# Patient Record
Sex: MALE | Race: WHITE | Employment: FULL TIME | URBAN - METROPOLITAN AREA
[De-identification: names, ages, dates, MRNs, and addresses within clinical notes are randomized per-mention and may not be internally consistent; named-entity substitution may affect disease eponyms.]

---

## 2023-03-13 RX ORDER — LATANOPROST 50 UG/ML
1 SOLUTION/ DROPS OPHTHALMIC
COMMUNITY

## 2023-03-13 RX ORDER — METOPROLOL SUCCINATE 50 MG/1
TABLET, EXTENDED RELEASE ORAL DAILY
COMMUNITY

## 2023-03-13 RX ORDER — LEVOTHYROXINE SODIUM 125 UG/1
TABLET ORAL
COMMUNITY

## 2023-03-13 RX ORDER — ALLOPURINOL 300 MG/1
TABLET ORAL DAILY
COMMUNITY

## 2023-03-13 RX ORDER — LISINOPRIL AND HYDROCHLOROTHIAZIDE 10; 12.5 MG/1; MG/1
TABLET ORAL DAILY
COMMUNITY

## 2023-03-13 RX ORDER — SIMVASTATIN 40 MG/1
TABLET, FILM COATED ORAL
COMMUNITY

## 2023-03-14 ENCOUNTER — APPOINTMENT (OUTPATIENT)
Dept: GENERAL RADIOLOGY | Age: 78
End: 2023-03-14
Attending: INTERNAL MEDICINE
Payer: MEDICARE

## 2023-03-14 ENCOUNTER — HOSPITAL ENCOUNTER (OUTPATIENT)
Age: 78
Discharge: HOME | End: 2023-03-15
Attending: INTERNAL MEDICINE | Admitting: INTERNAL MEDICINE
Payer: MEDICARE

## 2023-03-14 DIAGNOSIS — I20.0 UNSTABLE ANGINA (HCC): ICD-10-CM

## 2023-03-14 PROBLEM — R07.9 CHEST PAIN: Status: ACTIVE | Noted: 2023-03-14

## 2023-03-14 LAB
ACT BLD: 191 SEC (ref 74–125)
ALBUMIN SERPL-MCNC: 3.8 G/DL (ref 3.5–5)
ALBUMIN/GLOB SERPL: 1.3 (ref 1.1–2.2)
ALP SERPL-CCNC: 98 U/L (ref 45–117)
ALT SERPL-CCNC: 31 U/L (ref 12–78)
ANION GAP SERPL CALC-SCNC: 2 MMOL/L (ref 5–15)
AST SERPL W P-5'-P-CCNC: 19 U/L (ref 15–37)
ATRIAL RATE: 58 BPM
BILIRUB SERPL-MCNC: 0.5 MG/DL (ref 0.2–1)
BUN SERPL-MCNC: 23 MG/DL (ref 6–20)
BUN/CREAT SERPL: 20 (ref 12–20)
CA-I BLD-MCNC: 9.4 MG/DL (ref 8.5–10.1)
CALCULATED P AXIS, ECG09: 21 DEGREES
CALCULATED R AXIS, ECG10: 24 DEGREES
CALCULATED T AXIS, ECG11: 23 DEGREES
CHLORIDE SERPL-SCNC: 109 MMOL/L (ref 97–108)
CO2 SERPL-SCNC: 29 MMOL/L (ref 21–32)
CREAT SERPL-MCNC: 1.14 MG/DL (ref 0.7–1.3)
DIAGNOSIS, 93000: NORMAL
ERYTHROCYTE [DISTWIDTH] IN BLOOD BY AUTOMATED COUNT: 13 % (ref 11.5–14.5)
GLOBULIN SER CALC-MCNC: 3 G/DL (ref 2–4)
GLUCOSE SERPL-MCNC: 93 MG/DL (ref 65–100)
HCT VFR BLD AUTO: 44.9 % (ref 36.6–50.3)
HGB BLD-MCNC: 15.1 G/DL (ref 12.1–17)
MCH RBC QN AUTO: 31.8 PG (ref 26–34)
MCHC RBC AUTO-ENTMCNC: 33.6 G/DL (ref 30–36.5)
MCV RBC AUTO: 94.5 FL (ref 80–99)
NRBC # BLD: 0 K/UL (ref 0–0.01)
NRBC BLD-RTO: 0 PER 100 WBC
P-R INTERVAL, ECG05: 224 MS
PERFORMED BY, TECHID: ABNORMAL
PLATELET # BLD AUTO: 159 K/UL (ref 150–400)
PMV BLD AUTO: 10.5 FL (ref 8.9–12.9)
POTASSIUM SERPL-SCNC: 4.4 MMOL/L (ref 3.5–5.1)
PROT SERPL-MCNC: 6.8 G/DL (ref 6.4–8.2)
Q-T INTERVAL, ECG07: 408 MS
QRS DURATION, ECG06: 86 MS
QTC CALCULATION (BEZET), ECG08: 400 MS
RBC # BLD AUTO: 4.75 M/UL (ref 4.1–5.7)
SODIUM SERPL-SCNC: 140 MMOL/L (ref 136–145)
VENTRICULAR RATE, ECG03: 58 BPM
WBC # BLD AUTO: 5.8 K/UL (ref 4.1–11.1)

## 2023-03-14 PROCEDURE — 2709999900 HC NON-CHARGEABLE SUPPLY: Performed by: INTERNAL MEDICINE

## 2023-03-14 PROCEDURE — 99153 MOD SED SAME PHYS/QHP EA: CPT | Performed by: INTERNAL MEDICINE

## 2023-03-14 PROCEDURE — C1894 INTRO/SHEATH, NON-LASER: HCPCS | Performed by: INTERNAL MEDICINE

## 2023-03-14 PROCEDURE — 77030019698 HC SYR ANGI MDLON MRTM -A: Performed by: INTERNAL MEDICINE

## 2023-03-14 PROCEDURE — 80061 LIPID PANEL: CPT

## 2023-03-14 PROCEDURE — 99152 MOD SED SAME PHYS/QHP 5/>YRS: CPT | Performed by: INTERNAL MEDICINE

## 2023-03-14 PROCEDURE — 74011000250 HC RX REV CODE- 250: Performed by: INTERNAL MEDICINE

## 2023-03-14 PROCEDURE — 85027 COMPLETE CBC AUTOMATED: CPT

## 2023-03-14 PROCEDURE — 77030013516 HC DEV INFL ANGI MRTM -B: Performed by: INTERNAL MEDICINE

## 2023-03-14 PROCEDURE — C1769 GUIDE WIRE: HCPCS | Performed by: INTERNAL MEDICINE

## 2023-03-14 PROCEDURE — 85347 COAGULATION TIME ACTIVATED: CPT

## 2023-03-14 PROCEDURE — 93454 CORONARY ARTERY ANGIO S&I: CPT | Performed by: INTERNAL MEDICINE

## 2023-03-14 PROCEDURE — 80053 COMPREHEN METABOLIC PANEL: CPT

## 2023-03-14 PROCEDURE — 74011000636 HC RX REV CODE- 636: Performed by: INTERNAL MEDICINE

## 2023-03-14 PROCEDURE — C1876 STENT, NON-COA/NON-COV W/DEL: HCPCS | Performed by: INTERNAL MEDICINE

## 2023-03-14 PROCEDURE — 77030041064 HC CATH DX ANGI DXTRTY MEDT -B: Performed by: INTERNAL MEDICINE

## 2023-03-14 PROCEDURE — 77030008814 HC VLV ACC PLUS MRTM -B: Performed by: INTERNAL MEDICINE

## 2023-03-14 PROCEDURE — 74011250637 HC RX REV CODE- 250/637: Performed by: INTERNAL MEDICINE

## 2023-03-14 PROCEDURE — 93005 ELECTROCARDIOGRAM TRACING: CPT

## 2023-03-14 PROCEDURE — 77030008542 HC TBNG MON PRSS EDWD -A: Performed by: INTERNAL MEDICINE

## 2023-03-14 PROCEDURE — 36415 COLL VENOUS BLD VENIPUNCTURE: CPT

## 2023-03-14 PROCEDURE — 77030025703 HC SYR ANGI VACLOK MRTM -A: Performed by: INTERNAL MEDICINE

## 2023-03-14 PROCEDURE — 76210000017 HC OR PH I REC 1.5 TO 2 HR: Performed by: INTERNAL MEDICINE

## 2023-03-14 PROCEDURE — 71045 X-RAY EXAM CHEST 1 VIEW: CPT

## 2023-03-14 PROCEDURE — 77030029997 HC DEV COM RDL R BND TELE -B: Performed by: INTERNAL MEDICINE

## 2023-03-14 PROCEDURE — C1725 CATH, TRANSLUMIN NON-LASER: HCPCS | Performed by: INTERNAL MEDICINE

## 2023-03-14 PROCEDURE — C1887 CATHETER, GUIDING: HCPCS | Performed by: INTERNAL MEDICINE

## 2023-03-14 PROCEDURE — 74011250636 HC RX REV CODE- 250/636: Performed by: INTERNAL MEDICINE

## 2023-03-14 PROCEDURE — 92928 PRQ TCAT PLMT NTRAC ST 1 LES: CPT | Performed by: INTERNAL MEDICINE

## 2023-03-14 DEVICE — IMPLANTABLE DEVICE: Type: IMPLANTABLE DEVICE | Status: FUNCTIONAL

## 2023-03-14 RX ORDER — SODIUM CHLORIDE 0.9 % (FLUSH) 0.9 %
5-40 SYRINGE (ML) INJECTION EVERY 8 HOURS
Status: DISCONTINUED | OUTPATIENT
Start: 2023-03-14 | End: 2023-03-15 | Stop reason: HOSPADM

## 2023-03-14 RX ORDER — SODIUM CHLORIDE 0.9 % (FLUSH) 0.9 %
5-40 SYRINGE (ML) INJECTION AS NEEDED
Status: DISCONTINUED | OUTPATIENT
Start: 2023-03-14 | End: 2023-03-15 | Stop reason: HOSPADM

## 2023-03-14 RX ORDER — ALLOPURINOL 300 MG/1
300 TABLET ORAL DAILY
Status: DISCONTINUED | OUTPATIENT
Start: 2023-03-15 | End: 2023-03-15 | Stop reason: HOSPADM

## 2023-03-14 RX ORDER — GUAIFENESIN 100 MG/5ML
LIQUID (ML) ORAL AS NEEDED
Status: DISCONTINUED | OUTPATIENT
Start: 2023-03-14 | End: 2023-03-14 | Stop reason: HOSPADM

## 2023-03-14 RX ORDER — ATORVASTATIN CALCIUM 20 MG/1
20 TABLET, FILM COATED ORAL
Status: DISCONTINUED | OUTPATIENT
Start: 2023-03-14 | End: 2023-03-15 | Stop reason: HOSPADM

## 2023-03-14 RX ORDER — LATANOPROST 50 UG/ML
1 SOLUTION/ DROPS OPHTHALMIC
Status: DISCONTINUED | OUTPATIENT
Start: 2023-03-14 | End: 2023-03-14

## 2023-03-14 RX ORDER — OXYCODONE AND ACETAMINOPHEN 5; 325 MG/1; MG/1
1 TABLET ORAL
Status: DISCONTINUED | OUTPATIENT
Start: 2023-03-14 | End: 2023-03-15 | Stop reason: HOSPADM

## 2023-03-14 RX ORDER — ACETAMINOPHEN 325 MG/1
650 TABLET ORAL
Status: DISCONTINUED | OUTPATIENT
Start: 2023-03-14 | End: 2023-03-15 | Stop reason: HOSPADM

## 2023-03-14 RX ORDER — NALOXONE HYDROCHLORIDE 0.4 MG/ML
0.4 INJECTION, SOLUTION INTRAMUSCULAR; INTRAVENOUS; SUBCUTANEOUS AS NEEDED
Status: DISCONTINUED | OUTPATIENT
Start: 2023-03-14 | End: 2023-03-15 | Stop reason: HOSPADM

## 2023-03-14 RX ORDER — SIMVASTATIN 40 MG/1
40 TABLET, FILM COATED ORAL
Status: DISCONTINUED | OUTPATIENT
Start: 2023-03-14 | End: 2023-03-14

## 2023-03-14 RX ORDER — LATANOPROST 50 UG/ML
1 SOLUTION/ DROPS OPHTHALMIC EVERY EVENING
Status: DISCONTINUED | OUTPATIENT
Start: 2023-03-14 | End: 2023-03-14

## 2023-03-14 RX ORDER — SODIUM CHLORIDE 9 MG/ML
100 INJECTION, SOLUTION INTRAVENOUS CONTINUOUS
Status: DISPENSED | OUTPATIENT
Start: 2023-03-14 | End: 2023-03-14

## 2023-03-14 RX ORDER — LISINOPRIL 5 MG/1
10 TABLET ORAL DAILY
Status: DISCONTINUED | OUTPATIENT
Start: 2023-03-15 | End: 2023-03-15 | Stop reason: HOSPADM

## 2023-03-14 RX ORDER — SODIUM CHLORIDE 9 MG/ML
60 INJECTION, SOLUTION INTRAVENOUS CONTINUOUS
Status: DISCONTINUED | OUTPATIENT
Start: 2023-03-14 | End: 2023-03-15 | Stop reason: HOSPADM

## 2023-03-14 RX ORDER — HEPARIN SODIUM 1000 [USP'U]/ML
INJECTION, SOLUTION INTRAVENOUS; SUBCUTANEOUS AS NEEDED
Status: DISCONTINUED | OUTPATIENT
Start: 2023-03-14 | End: 2023-03-14 | Stop reason: HOSPADM

## 2023-03-14 RX ORDER — METOPROLOL SUCCINATE 50 MG/1
50 TABLET, EXTENDED RELEASE ORAL DAILY
Status: DISCONTINUED | OUTPATIENT
Start: 2023-03-15 | End: 2023-03-15 | Stop reason: HOSPADM

## 2023-03-14 RX ORDER — LATANOPROST 50 UG/ML
1 SOLUTION/ DROPS OPHTHALMIC
Status: DISCONTINUED | OUTPATIENT
Start: 2023-03-14 | End: 2023-03-15 | Stop reason: HOSPADM

## 2023-03-14 RX ORDER — FENTANYL CITRATE 50 UG/ML
INJECTION, SOLUTION INTRAMUSCULAR; INTRAVENOUS AS NEEDED
Status: DISCONTINUED | OUTPATIENT
Start: 2023-03-14 | End: 2023-03-14 | Stop reason: HOSPADM

## 2023-03-14 RX ORDER — MIDAZOLAM HYDROCHLORIDE 1 MG/ML
INJECTION INTRAMUSCULAR; INTRAVENOUS AS NEEDED
Status: DISCONTINUED | OUTPATIENT
Start: 2023-03-14 | End: 2023-03-14 | Stop reason: HOSPADM

## 2023-03-14 RX ORDER — GUAIFENESIN 100 MG/5ML
81 LIQUID (ML) ORAL DAILY
Status: DISCONTINUED | OUTPATIENT
Start: 2023-03-15 | End: 2023-03-15 | Stop reason: HOSPADM

## 2023-03-14 RX ORDER — LISINOPRIL AND HYDROCHLOROTHIAZIDE 10; 12.5 MG/1; MG/1
1 TABLET ORAL DAILY
Status: DISCONTINUED | OUTPATIENT
Start: 2023-03-15 | End: 2023-03-14

## 2023-03-14 RX ORDER — LIDOCAINE HYDROCHLORIDE 10 MG/ML
INJECTION INFILTRATION; PERINEURAL AS NEEDED
Status: DISCONTINUED | OUTPATIENT
Start: 2023-03-14 | End: 2023-03-14 | Stop reason: HOSPADM

## 2023-03-14 RX ORDER — HEPARIN SODIUM 200 [USP'U]/100ML
INJECTION, SOLUTION INTRAVENOUS
Status: COMPLETED | OUTPATIENT
Start: 2023-03-14 | End: 2023-03-14

## 2023-03-14 RX ORDER — CLOPIDOGREL 300 MG/1
TABLET, FILM COATED ORAL AS NEEDED
Status: DISCONTINUED | OUTPATIENT
Start: 2023-03-14 | End: 2023-03-14 | Stop reason: HOSPADM

## 2023-03-14 RX ORDER — HYDROCHLOROTHIAZIDE 25 MG/1
12.5 TABLET ORAL DAILY
Status: DISCONTINUED | OUTPATIENT
Start: 2023-03-15 | End: 2023-03-15 | Stop reason: HOSPADM

## 2023-03-14 RX ORDER — VERAPAMIL HYDROCHLORIDE 2.5 MG/ML
INJECTION, SOLUTION INTRAVENOUS AS NEEDED
Status: DISCONTINUED | OUTPATIENT
Start: 2023-03-14 | End: 2023-03-14 | Stop reason: HOSPADM

## 2023-03-14 RX ORDER — CLOPIDOGREL BISULFATE 75 MG/1
75 TABLET ORAL DAILY
Status: DISCONTINUED | OUTPATIENT
Start: 2023-03-15 | End: 2023-03-15 | Stop reason: HOSPADM

## 2023-03-14 RX ORDER — NITROGLYCERIN 5 MG/ML
INJECTION, SOLUTION INTRAVENOUS AS NEEDED
Status: DISCONTINUED | OUTPATIENT
Start: 2023-03-14 | End: 2023-03-14 | Stop reason: HOSPADM

## 2023-03-14 RX ORDER — ONDANSETRON 2 MG/ML
4 INJECTION INTRAMUSCULAR; INTRAVENOUS
Status: DISCONTINUED | OUTPATIENT
Start: 2023-03-14 | End: 2023-03-15 | Stop reason: HOSPADM

## 2023-03-14 RX ADMIN — LATANOPROST 1 DROP: 50 SOLUTION OPHTHALMIC at 21:05

## 2023-03-14 RX ADMIN — ATORVASTATIN CALCIUM 20 MG: 20 TABLET, FILM COATED ORAL at 21:05

## 2023-03-14 RX ADMIN — SODIUM CHLORIDE 60 ML/HR: 9 INJECTION, SOLUTION INTRAVENOUS at 11:40

## 2023-03-14 RX ADMIN — SODIUM CHLORIDE, PRESERVATIVE FREE 10 ML: 5 INJECTION INTRAVENOUS at 21:05

## 2023-03-14 NOTE — PERIOP NOTES
TRANSFER - OUT REPORT:    Verbal report given to PATIENTS [facility], RN(name) on Chino Mendoza  being transferred to [facility](unit) for routine post - op       Report consisted of patients Situation, Background, Assessment and   Recommendations(SBAR). Information from the following report(s) SBAR, Procedure Summary, Intake/Output, and MAR was reviewed with the receiving nurse. Opportunity for questions and clarification was provided.       Patient transported with:   Monitor  Registered Nurse

## 2023-03-14 NOTE — PROGRESS NOTES
2 person skin assessment performed with Sami Carmichael RN. Patients skin is clean, dry, and intact.

## 2023-03-14 NOTE — Clinical Note
Contrast Dose Calculator:   Patient's age: 68.   Patient's sex: Male. Patient weight (kg) = 97.5. Creatinine level (mg/dL) = 1.14. Creatinine clearance (mL/min): 75.   Contrast concentration (mg/mL) = 370. MACD = 300 mL. Max Contrast dose per Creatinine Cl calculator = 168.75 mL.

## 2023-03-15 VITALS
SYSTOLIC BLOOD PRESSURE: 128 MMHG | DIASTOLIC BLOOD PRESSURE: 68 MMHG | RESPIRATION RATE: 18 BRPM | HEIGHT: 70 IN | BODY MASS INDEX: 30.78 KG/M2 | TEMPERATURE: 98.4 F | WEIGHT: 215 LBS | HEART RATE: 76 BPM | OXYGEN SATURATION: 98 %

## 2023-03-15 LAB
CHOLEST SERPL-MCNC: 126 MG/DL
HDLC SERPL-MCNC: 35 MG/DL
HDLC SERPL: 3.6 (ref 0–5)
LDLC SERPL CALC-MCNC: 67.4 MG/DL (ref 0–100)
LIPID PROFILE,FLP: NORMAL
TRIGL SERPL-MCNC: 118 MG/DL (ref ?–150)
VLDLC SERPL CALC-MCNC: 23.6 MG/DL

## 2023-03-15 PROCEDURE — 74011000250 HC RX REV CODE- 250: Performed by: INTERNAL MEDICINE

## 2023-03-15 PROCEDURE — 74011250637 HC RX REV CODE- 250/637: Performed by: INTERNAL MEDICINE

## 2023-03-15 RX ORDER — CLOPIDOGREL BISULFATE 75 MG/1
75 TABLET ORAL DAILY
Qty: 30 TABLET | Refills: 3 | Status: SHIPPED | OUTPATIENT
Start: 2023-03-16

## 2023-03-15 RX ORDER — ATORVASTATIN CALCIUM 20 MG/1
20 TABLET, FILM COATED ORAL DAILY
Qty: 30 TABLET | Refills: 1 | Status: SHIPPED | OUTPATIENT
Start: 2023-03-15

## 2023-03-15 RX ADMIN — HYDROCHLOROTHIAZIDE 12.5 MG: 25 TABLET ORAL at 08:38

## 2023-03-15 RX ADMIN — METOPROLOL SUCCINATE 50 MG: 50 TABLET, EXTENDED RELEASE ORAL at 08:37

## 2023-03-15 RX ADMIN — ALLOPURINOL 300 MG: 300 TABLET ORAL at 08:37

## 2023-03-15 RX ADMIN — LEVOTHYROXINE SODIUM 125 MCG: 0.03 TABLET ORAL at 08:37

## 2023-03-15 RX ADMIN — SODIUM CHLORIDE, PRESERVATIVE FREE 10 ML: 5 INJECTION INTRAVENOUS at 06:19

## 2023-03-15 RX ADMIN — CLOPIDOGREL BISULFATE 75 MG: 75 TABLET ORAL at 08:37

## 2023-03-15 RX ADMIN — ASPIRIN 81 MG 81 MG: 81 TABLET ORAL at 08:37

## 2023-03-15 RX ADMIN — LISINOPRIL 10 MG: 5 TABLET ORAL at 08:37

## 2023-03-15 NOTE — DISCHARGE INSTRUCTIONS
Patient has been referred for outpatient cardiac rehab  [facility]  [address], [facility] Samy Cha  [phone]  Appointment:  Wednesday, April 5 at 1:30

## 2023-03-15 NOTE — PROGRESS NOTES
[address] March 15, 2023       RE: Adriane Duncan      To Whom It May Concern,    This is to certify that Adriane Duncan may return to work on 3/20/2023. Please feel free to contact my office if you have any questions or concerns. Thank you for your assistance in this matter.       Sincerely,    MD Bette Havrey MD    [phone] (office)

## 2023-03-15 NOTE — PROGRESS NOTES
Problem: Falls - Risk of  Goal: *Absence of Falls  Description: Document Sundra Dublin Fall Risk and appropriate interventions in the flowsheet.   Outcome: Progressing Towards Goal  Note: Fall Risk Interventions:                                Problem: Patient Education: Go to Patient Education Activity  Goal: Patient/Family Education  Outcome: Progressing Towards Goal

## 2023-03-15 NOTE — PROGRESS NOTES
Patients IV and telemetry box removed. Discharged papers signed. Patient in room getting dressed. Notified Dr. Norma Cullen of the new prescriptions not appearing to be sent to pharmacy per Discharge paperwork, was reassured by Dr. Norma Cullen prescriptions were sent. Patient notified and provided the number to [facility] if he were to have any questions getting new prescriptions. Discharge plan of care/case management plan validated with provider discharge order.

## 2023-03-15 NOTE — PROGRESS NOTES
Problem: Falls - Risk of  Goal: *Absence of Falls  Description: Document Satish Marvin Fall Risk and appropriate interventions in the flowsheet.   Outcome: Progressing Towards Goal  Note: Fall Risk Interventions:                                Problem: Patient Education: Go to Patient Education Activity  Goal: Patient/Family Education  Outcome: Progressing Towards Goal

## 2023-03-15 NOTE — PROGRESS NOTES
Provided patient education about and explained the importance of medication compliance. Patient stated that he can afford the antiplatelet medication prescribed. Patient was advised that if the antiplatelet medication becomes unaffordable, he must notify the cardiologist immediately so that an alternate plan for antiplatelet therapy can be made. Patient stated that he will fill this prescription before or upon discharge so that it will be available for the next scheduled dose after discharge. Patient asked appropriate questions and verbalized understanding during this consultation and was given printed teaching materials and my contact information in case I can provide further assistance. We discussed the options to have prescriptions filled. Spoke with patient about phase 2 outpatient cardiac rehab. Patient was given a choice of facilities to be enrolled and chose [facility]. Patient was scheduled for and made aware of, verbally and in writing, an initial evaluation appointment to begin cardiac rehab on April 5 at 1:30. Patient was made aware of this verbally and in writing. Patient's information was forwarded to this facility. Patient verbalized understanding and was given printed teaching materials, my contact information in case he needs further assistance, and the address and phone number for the cardiac rehab facility to which he has been referred.

## 2023-03-15 NOTE — DISCHARGE SUMMARY
Cardiology Discharge Summary     Patient ID: Laura Motta  [id number]  87 y.o.  1945    Admit Date: 3/14/2023  Discharge Date: 3/15/2023   Admitting Physician: Jacquelyn Gaytan MD   Discharge Physician: Anca Calderón MD    Admission Diagnoses: Unstable angina [facility]) [I20.0]  Chest pain [R07.9]    Discharge Diagnoses: Active Problems:    Chest pain (3/14/2023)    Unstable angina  S/p Carotid endarterectomy/right  Peripheral arterial disease  Hypertension  For cholesterolemia  Both thyroidism    Cardiology Procedures this Admission:  Left heart catheterization with PCI    Hospital Course: Patient was admitted with unstable angina has a history of CAD status post PCI 2002. He had left heart cath 3/14/2023 with findings as below:      Indication unstable angina. Procedure selective coronary angiogram in the left and right system, angioplasty and stenting of the proximal to mid LAD, IV conscious sedation using Versed and fentanyl. Proximal to mid LAD is a type II 80% stenosis at the ostium of the first diagonal branch. Left main with irregularities. The circumflex is nondominant with irregularities. Dominant and patent right coronary artery including stented segment in its midportion. Status post angioplasty and stenting of the proximal to mid LAD using 3.0 x 12 drug-eluting Palmer stent to a size of 3.3 mm meter. Continue dual antiplatelet therapy, risk modification. Recent Labs     03/14/23  1130      K 4.4   BUN 23*   CREA 1.14   WBC 5.8   HGB 15.1   HCT 44.9        No results found for: CHOL, CHOLX, CHLST, CHOLV, HDL, HDLP, LDL, LDLC, DLDLP, TGLX, TRIGL, TRIGP  Recent Labs     03/14/23  1130   ALT 31   AP 98     No results for input(s): INR, PTP, APTT, INREXT in the last 72 hours. Patient was ambulating without symptoms prior to d/c.     Consults: None  Disposition: home  Discharge Condition: Good  Patient Instructions:   Current Discharge Medication List        START taking these medications    Details   clopidogreL (PLAVIX) 75 mg tab Take 1 Tablet by mouth daily. Indications: blood clot prevention following percutaneous coronary intervention  Qty: 30 Tablet, Refills: 3      atorvastatin (LIPITOR) 20 mg tablet Take 1 Tablet by mouth daily. Indications: hardening of the arteries due to plaque buildup  Qty: 30 Tablet, Refills: 1           CONTINUE these medications which have NOT CHANGED    Details   aspirin 81 mg cap Take 81 mg by mouth.      levothyroxine (SYNTHROID) 125 mcg tablet Take  by mouth Daily (before breakfast). lisinopril-hydroCHLOROthiazide (PRINZIDE, ZESTORETIC) 10-12.5 mg per tablet Take  by mouth daily. metoprolol succinate (TOPROL-XL) 50 mg XL tablet Take  by mouth daily. 1/2 am , 1/2 pm      simvastatin (ZOCOR) 40 mg tablet Take  by mouth nightly. allopurinoL (ZYLOPRIM) 300 mg tablet Take  by mouth daily. latanoprost (XALATAN) 0.005 % ophthalmic solution Administer 1 Drop to both eyes nightly. Referenced discharge instructions provided by nursing for diet and activity.   Follow-up:   Follow-up Information       Follow up With Specialties Details Why Contact Info    Chin Jack MD Cardiovascular Disease Physician Follow up in 2 week(s)  [place] [id number]  [phone]                > 30 minutes coordinating discharge  Yes     Signed:  Matthew Cotton MD  3/15/2023  11:30 AM

## 2023-04-18 ENCOUNTER — TRANSCRIBE ORDER (OUTPATIENT)
Dept: SCHEDULING | Age: 78
End: 2023-04-18

## 2023-04-18 DIAGNOSIS — R06.00 PAROXYSMAL NOCTURNAL DYSPNEA: ICD-10-CM

## 2023-04-18 DIAGNOSIS — I25.10 DISEASE OF CARDIOVASCULAR SYSTEM: ICD-10-CM

## 2023-04-18 DIAGNOSIS — I25.83 CORONARY ATHEROSCLEROSIS DUE TO LIPID RICH PLAQUE (CODE): Primary | ICD-10-CM

## 2023-04-21 ENCOUNTER — HOSPITAL ENCOUNTER (OUTPATIENT)
Dept: NON INVASIVE DIAGNOSTICS | Age: 78
End: 2023-04-21
Attending: PHYSICIAN ASSISTANT
Payer: MEDICARE

## 2023-04-21 DIAGNOSIS — I25.83 CORONARY ATHEROSCLEROSIS DUE TO LIPID RICH PLAQUE (CODE): ICD-10-CM

## 2023-04-21 DIAGNOSIS — M79.606 LEG PAIN: ICD-10-CM

## 2023-04-21 DIAGNOSIS — M79.606 PAIN OF LOWER EXTREMITY, UNSPECIFIED LATERALITY: Primary | ICD-10-CM

## 2023-04-21 DIAGNOSIS — I25.10 DISEASE OF CARDIOVASCULAR SYSTEM: ICD-10-CM

## 2023-04-21 DIAGNOSIS — R06.00 PAROXYSMAL NOCTURNAL DYSPNEA: ICD-10-CM

## 2023-04-21 DIAGNOSIS — I65.29 CAROTID ARTERY CALCIFICATION, UNSPECIFIED LATERALITY: Primary | ICD-10-CM

## 2023-04-21 PROCEDURE — 93923 UPR/LXTR ART STDY 3+ LVLS: CPT

## 2023-04-21 PROCEDURE — 93880 EXTRACRANIAL BILAT STUDY: CPT

## 2023-04-22 LAB
LEFT ABI: 1.08
LEFT ARM BP: 163 MMHG
LEFT ARM BP: 163 MMHG
LEFT CALF PRESSURE: 204 MMHG
LEFT CCA DIST DIAS: 11.3 CM/S
LEFT CCA DIST SYS: 80.6 CM/S
LEFT CCA PROX DIAS: 9.7 CM/S
LEFT CCA PROX SYS: 65.5 CM/S
LEFT ECA DIAS: 7.24 CM/S
LEFT ECA SYS: 92.9 CM/S
LEFT ICA DIST DIAS: 17.5 CM/S
LEFT ICA DIST SYS: 93.5 CM/S
LEFT ICA PROX DIAS: 19.3 CM/S
LEFT ICA PROX SYS: 88.7 CM/S
LEFT ICA/CCA SYS: 1.1
LEFT POSTERIOR TIBIAL: 174 MMHG
LEFT TBI: 1.02
LEFT TOE PRESSURE: 167 MMHG
LEFT VERTEBRAL DIAS: 0 CM/S
LEFT VERTEBRAL SYS: 20.3 CM/S
RIGHT ABI: 1.12
RIGHT ARM BP: 158 MMHG
RIGHT ARM BP: 158 MMHG
RIGHT CALF PRESSURE: 208 MMHG
RIGHT CCA DIST DIAS: 14.5 CM/S
RIGHT CCA DIST SYS: 63.4 CM/S
RIGHT CCA PROX DIAS: 11.5 CM/S
RIGHT CCA PROX SYS: 58 CM/S
RIGHT ECA DIAS: 10.7 CM/S
RIGHT ECA SYS: 110 CM/S
RIGHT ICA DIST DIAS: 0 CM/S
RIGHT ICA DIST SYS: 90.1 CM/S
RIGHT ICA PROX DIAS: 24.4 CM/S
RIGHT ICA PROX SYS: 92.4 CM/S
RIGHT ICA/CCA SYS: 1.46
RIGHT POSTERIOR TIBIAL: 183 MMHG
RIGHT TBI: 1.01
RIGHT TOE PRESSURE: 165 MMHG
RIGHT VERTEBRAL DIAS: 5.97 CM/S
RIGHT VERTEBRAL SYS: 29.5 CM/S
VAS LEFT DORSALIS PEDIS BP: 176 MMHG
VAS LEFT SUBCLAVIAN PROX EDV: 0 CM/S
VAS LEFT SUBCLAVIAN PROX PSV: 125 CM/S
VAS RIGHT DORSALIS PEDIS BP: 166 MMHG
VAS RIGHT SUBCLAVIAN PROX EDV: 0 CM/S
VAS RIGHT SUBCLAVIAN PROX PSV: 100 CM/S

## 2023-04-22 PROCEDURE — 93880 EXTRACRANIAL BILAT STUDY: CPT | Performed by: SURGERY

## 2024-06-17 ENCOUNTER — HOSPITAL ENCOUNTER (OUTPATIENT)
Facility: HOSPITAL | Age: 79
Discharge: HOME | End: 2024-06-20
Payer: MEDICARE

## 2024-06-17 VITALS
TEMPERATURE: 98.5 F | BODY MASS INDEX: 31.38 KG/M2 | DIASTOLIC BLOOD PRESSURE: 65 MMHG | SYSTOLIC BLOOD PRESSURE: 136 MMHG | HEART RATE: 73 BPM | HEIGHT: 70 IN | WEIGHT: 219.2 LBS | RESPIRATION RATE: 20 BRPM | OXYGEN SATURATION: 94 %

## 2024-06-17 LAB
ALBUMIN SERPL-MCNC: 3.9 G/DL (ref 3.5–5)
ALBUMIN/GLOB SERPL: 1.3 (ref 1.1–2.2)
ALP SERPL-CCNC: 99 U/L (ref 45–117)
ALT SERPL-CCNC: 41 U/L (ref 12–78)
ANION GAP SERPL CALC-SCNC: 5 MMOL/L (ref 5–15)
APTT PPP: 26.1 SEC (ref 21.2–34.1)
AST SERPL W P-5'-P-CCNC: 31 U/L (ref 15–37)
BILIRUB SERPL-MCNC: 0.5 MG/DL (ref 0.2–1)
BUN SERPL-MCNC: 21 MG/DL (ref 6–20)
BUN/CREAT SERPL: 20 (ref 12–20)
CA-I BLD-MCNC: 9.4 MG/DL (ref 8.5–10.1)
CHLORIDE SERPL-SCNC: 110 MMOL/L (ref 97–108)
CO2 SERPL-SCNC: 24 MMOL/L (ref 21–32)
CREAT SERPL-MCNC: 1.04 MG/DL (ref 0.7–1.3)
ERYTHROCYTE [DISTWIDTH] IN BLOOD BY AUTOMATED COUNT: 13.2 % (ref 11.5–14.5)
GLOBULIN SER CALC-MCNC: 3 G/DL (ref 2–4)
GLUCOSE SERPL-MCNC: 106 MG/DL (ref 65–100)
HCT VFR BLD AUTO: 40.6 % (ref 36.6–50.3)
HGB BLD-MCNC: 13.8 G/DL (ref 12.1–17)
INR PPP: 1 (ref 0.9–1.1)
MCH RBC QN AUTO: 31.9 PG (ref 26–34)
MCHC RBC AUTO-ENTMCNC: 34 G/DL (ref 30–36.5)
MCV RBC AUTO: 93.8 FL (ref 80–99)
NRBC # BLD: 0 K/UL (ref 0–0.01)
NRBC BLD-RTO: 0 PER 100 WBC
PLATELET # BLD AUTO: 150 K/UL (ref 150–400)
PMV BLD AUTO: 10.7 FL (ref 8.9–12.9)
POTASSIUM SERPL-SCNC: 3.8 MMOL/L (ref 3.5–5.1)
PROT SERPL-MCNC: 6.9 G/DL (ref 6.4–8.2)
PROTHROMBIN TIME: 14 SEC (ref 11.9–14.6)
RBC # BLD AUTO: 4.33 M/UL (ref 4.1–5.7)
SODIUM SERPL-SCNC: 139 MMOL/L (ref 136–145)
THERAPEUTIC RANGE: NORMAL SEC (ref 82–109)
WBC # BLD AUTO: 5.7 K/UL (ref 4.1–11.1)

## 2024-06-17 PROCEDURE — 36415 COLL VENOUS BLD VENIPUNCTURE: CPT

## 2024-06-17 PROCEDURE — 85027 COMPLETE CBC AUTOMATED: CPT

## 2024-06-17 PROCEDURE — 85610 PROTHROMBIN TIME: CPT

## 2024-06-17 PROCEDURE — 80053 COMPREHEN METABOLIC PANEL: CPT

## 2024-06-17 PROCEDURE — 85730 THROMBOPLASTIN TIME PARTIAL: CPT

## 2024-06-17 RX ORDER — LATANOPROST 50 UG/ML
1 SOLUTION/ DROPS OPHTHALMIC NIGHTLY
COMMUNITY

## 2024-06-17 RX ORDER — ATORVASTATIN CALCIUM 40 MG/1
40 TABLET, FILM COATED ORAL DAILY
COMMUNITY

## 2024-06-17 RX ORDER — OLMESARTAN MEDOXOMIL 5 MG/1
5 TABLET ORAL DAILY
COMMUNITY

## 2024-06-17 RX ORDER — LEVOTHYROXINE SODIUM 0.12 MG/1
125 TABLET ORAL DAILY
COMMUNITY

## 2024-06-17 RX ORDER — CLOPIDOGREL BISULFATE 75 MG/1
75 TABLET ORAL DAILY
COMMUNITY

## 2024-06-17 RX ORDER — DORZOLAMIDE HYDROCHLORIDE AND TIMOLOL MALEATE 20; 5 MG/ML; MG/ML
1 SOLUTION/ DROPS OPHTHALMIC DAILY
COMMUNITY

## 2024-06-17 RX ORDER — METOPROLOL SUCCINATE 25 MG/1
25 TABLET, EXTENDED RELEASE ORAL 2 TIMES DAILY
COMMUNITY

## 2024-06-17 RX ORDER — ALLOPURINOL 300 MG/1
300 TABLET ORAL DAILY
COMMUNITY

## 2024-06-17 RX ORDER — OLMESARTAN MEDOXOMIL 20 MG/1
20 TABLET ORAL DAILY
COMMUNITY
End: 2024-06-17

## 2024-06-21 ENCOUNTER — HOSPITAL ENCOUNTER (OUTPATIENT)
Facility: HOSPITAL | Age: 79
Discharge: HOME | End: 2024-06-21
Attending: INTERNAL MEDICINE | Admitting: INTERNAL MEDICINE
Payer: MEDICARE

## 2024-06-21 VITALS
OXYGEN SATURATION: 97 % | HEART RATE: 65 BPM | SYSTOLIC BLOOD PRESSURE: 141 MMHG | DIASTOLIC BLOOD PRESSURE: 83 MMHG | RESPIRATION RATE: 18 BRPM | TEMPERATURE: 97.8 F

## 2024-06-21 DIAGNOSIS — R07.9 CHEST PAIN: ICD-10-CM

## 2024-06-21 PROCEDURE — 7100000000 HC PACU RECOVERY - FIRST 15 MIN: Performed by: INTERNAL MEDICINE

## 2024-06-21 PROCEDURE — 99153 MOD SED SAME PHYS/QHP EA: CPT | Performed by: INTERNAL MEDICINE

## 2024-06-21 PROCEDURE — 2709999900 HC NON-CHARGEABLE SUPPLY: Performed by: INTERNAL MEDICINE

## 2024-06-21 PROCEDURE — 7100000001 HC PACU RECOVERY - ADDTL 15 MIN: Performed by: INTERNAL MEDICINE

## 2024-06-21 PROCEDURE — 6360000002 HC RX W HCPCS: Performed by: INTERNAL MEDICINE

## 2024-06-21 PROCEDURE — 99152 MOD SED SAME PHYS/QHP 5/>YRS: CPT | Performed by: INTERNAL MEDICINE

## 2024-06-21 PROCEDURE — 6360000004 HC RX CONTRAST MEDICATION: Performed by: INTERNAL MEDICINE

## 2024-06-21 PROCEDURE — 2500000003 HC RX 250 WO HCPCS: Performed by: INTERNAL MEDICINE

## 2024-06-21 PROCEDURE — 93571 IV DOP VEL&/PRESS C FLO 1ST: CPT | Performed by: INTERNAL MEDICINE

## 2024-06-21 PROCEDURE — C1769 GUIDE WIRE: HCPCS | Performed by: INTERNAL MEDICINE

## 2024-06-21 PROCEDURE — 7100000010 HC PHASE II RECOVERY - FIRST 15 MIN: Performed by: INTERNAL MEDICINE

## 2024-06-21 PROCEDURE — 2580000003 HC RX 258: Performed by: INTERNAL MEDICINE

## 2024-06-21 PROCEDURE — C1894 INTRO/SHEATH, NON-LASER: HCPCS | Performed by: INTERNAL MEDICINE

## 2024-06-21 PROCEDURE — 7100000011 HC PHASE II RECOVERY - ADDTL 15 MIN: Performed by: INTERNAL MEDICINE

## 2024-06-21 RX ORDER — SODIUM CHLORIDE 9 MG/ML
INJECTION, SOLUTION INTRAVENOUS CONTINUOUS
Status: DISCONTINUED | OUTPATIENT
Start: 2024-06-21 | End: 2024-06-21 | Stop reason: HOSPADM

## 2024-06-21 RX ORDER — HEPARIN SODIUM 1000 [USP'U]/ML
INJECTION, SOLUTION INTRAVENOUS; SUBCUTANEOUS PRN
Status: DISCONTINUED | OUTPATIENT
Start: 2024-06-21 | End: 2024-06-21 | Stop reason: HOSPADM

## 2024-06-21 RX ORDER — ASPIRIN 81 MG/1
81 TABLET, CHEWABLE ORAL DAILY
COMMUNITY

## 2024-06-21 RX ORDER — SODIUM CHLORIDE 9 MG/ML
INJECTION, SOLUTION INTRAVENOUS PRN
Status: ACTIVE | OUTPATIENT
Start: 2024-06-21 | End: 2024-06-21

## 2024-06-21 RX ORDER — ACETAMINOPHEN 325 MG/1
650 TABLET ORAL EVERY 4 HOURS PRN
Status: DISCONTINUED | OUTPATIENT
Start: 2024-06-21 | End: 2024-06-21 | Stop reason: HOSPADM

## 2024-06-21 RX ORDER — HEPARIN SODIUM 200 [USP'U]/100ML
INJECTION, SOLUTION INTRAVENOUS CONTINUOUS PRN
Status: COMPLETED | OUTPATIENT
Start: 2024-06-21 | End: 2024-06-21

## 2024-06-21 RX ORDER — MIDAZOLAM HYDROCHLORIDE 1 MG/ML
INJECTION INTRAMUSCULAR; INTRAVENOUS PRN
Status: DISCONTINUED | OUTPATIENT
Start: 2024-06-21 | End: 2024-06-21 | Stop reason: HOSPADM

## 2024-06-21 RX ORDER — LIDOCAINE HYDROCHLORIDE 10 MG/ML
INJECTION, SOLUTION INFILTRATION; PERINEURAL PRN
Status: DISCONTINUED | OUTPATIENT
Start: 2024-06-21 | End: 2024-06-21 | Stop reason: HOSPADM

## 2024-06-21 RX ORDER — FENTANYL CITRATE 50 UG/ML
INJECTION, SOLUTION INTRAMUSCULAR; INTRAVENOUS PRN
Status: DISCONTINUED | OUTPATIENT
Start: 2024-06-21 | End: 2024-06-21 | Stop reason: HOSPADM

## 2024-06-21 RX ADMIN — SODIUM CHLORIDE: 9 INJECTION, SOLUTION INTRAVENOUS at 07:30

## 2024-06-21 ASSESSMENT — PAIN - FUNCTIONAL ASSESSMENT
PAIN_FUNCTIONAL_ASSESSMENT: NONE - DENIES PAIN
PAIN_FUNCTIONAL_ASSESSMENT: 0-10

## 2024-06-21 NOTE — PROGRESS NOTES
DRESSING RIGHT WRIST DRY AND INTACT WITH ARM BOARD IN PLACE , IV DC'D SITE INTACT NO SWELLING NOTED , DISCHARGE INSTRUCTIONS GIVEN TO PT AND PT'S WIFE KARISHMA , BOTH VERBALIZED UNDERSTANDING , NO DISTRESS NOTED

## 2024-06-21 NOTE — DISCHARGE INSTRUCTIONS
Coronary Angiogram: What to Expect at Home  Your Recovery  A coronary angiogram is a test to examine the large blood vessels of your heart (coronary arteries).  The doctor inserted a thin, flexible tube (catheter into a blood vessel in your groin or wrist.  Your groin or wrist may have a bruise and feel sore for a few days after the procedure.  You can do light activities around the house.  But do not do anything strenuous until your doctor says it is ok.  This may be for several days.  This care sheet gives you a general idea about how long it will take for you to recover.  But each person recovers at a different pace.  Follow the steps below to feel better as quickly as possible.    How can you care for yourself at home?    Activity  If the doctor gave you a sedative:  For 24 hours, don't do anything that requires attention to detail, such as going to work, making important decisions, or signing any legal documents.  It takes time for the medicine's effects to completely wear off.  For your safety, do not drive or operate any machinery that could be dangerous.  Wait until the medicine wears off and you can think clearly and react easily.  Do not do any strenuous exercise and do not lift, pull, or push anything heavier than 5 pounds (approximately the weight of 1 gallon of milk) until your doctor says it is ok.  This may be for several days.  You can walk around the house and do light activity, such as cooking.  If the catheter was placed in your groin and you must use stairs, just go up and down slowly in as few trips as possible for the first couple of days.  If the catheter was placed in your wrist, do not bend your wrist deeply for the first couple of days.  A soft wrist-splint may be placed on your wrist as a reminder following the procedure.  Be careful using your hand to get into and out of a chair or bed and when opening doors.   Get regular exercise, after being cleared by your cardiologist.

## 2024-06-21 NOTE — PROGRESS NOTES
Patient transferred to [facility] via stretcher in stable condition.  Bedside report given, SBAR reviewed, sites viewed. Patients belongings and wife at the bedside.

## 2025-07-15 ENCOUNTER — ANESTHESIA (OUTPATIENT)
Facility: HOSPITAL | Age: 80
End: 2025-07-15
Payer: MEDICARE

## 2025-07-15 ENCOUNTER — HOSPITAL ENCOUNTER (OUTPATIENT)
Facility: HOSPITAL | Age: 80
Setting detail: OUTPATIENT SURGERY
Discharge: HOME | End: 2025-07-15
Attending: SPECIALIST | Admitting: SPECIALIST
Payer: MEDICARE

## 2025-07-15 ENCOUNTER — ANESTHESIA EVENT (OUTPATIENT)
Facility: HOSPITAL | Age: 80
End: 2025-07-15
Payer: MEDICARE

## 2025-07-15 VITALS
HEIGHT: 70 IN | DIASTOLIC BLOOD PRESSURE: 86 MMHG | RESPIRATION RATE: 21 BRPM | WEIGHT: 205.91 LBS | TEMPERATURE: 98 F | BODY MASS INDEX: 29.48 KG/M2 | SYSTOLIC BLOOD PRESSURE: 137 MMHG | HEART RATE: 74 BPM | OXYGEN SATURATION: 100 %

## 2025-07-15 PROCEDURE — 3700000001 HC ADD 15 MINUTES (ANESTHESIA): Performed by: SPECIALIST

## 2025-07-15 PROCEDURE — 3600007502: Performed by: SPECIALIST

## 2025-07-15 PROCEDURE — 7100000011 HC PHASE II RECOVERY - ADDTL 15 MIN: Performed by: SPECIALIST

## 2025-07-15 PROCEDURE — 3700000000 HC ANESTHESIA ATTENDED CARE: Performed by: SPECIALIST

## 2025-07-15 PROCEDURE — 3600007512: Performed by: SPECIALIST

## 2025-07-15 PROCEDURE — 88305 TISSUE EXAM BY PATHOLOGIST: CPT

## 2025-07-15 PROCEDURE — 2709999900 HC NON-CHARGEABLE SUPPLY: Performed by: SPECIALIST

## 2025-07-15 PROCEDURE — 6360000002 HC RX W HCPCS: Performed by: NURSE ANESTHETIST, CERTIFIED REGISTERED

## 2025-07-15 PROCEDURE — 7100000010 HC PHASE II RECOVERY - FIRST 15 MIN: Performed by: SPECIALIST

## 2025-07-15 RX ORDER — LIDOCAINE HYDROCHLORIDE 20 MG/ML
INJECTION, SOLUTION INTRAVENOUS
Status: DISCONTINUED | OUTPATIENT
Start: 2025-07-15 | End: 2025-07-15 | Stop reason: SDUPTHER

## 2025-07-15 RX ORDER — SODIUM CHLORIDE 9 MG/ML
INJECTION, SOLUTION INTRAVENOUS CONTINUOUS
Status: DISCONTINUED | OUTPATIENT
Start: 2025-07-15 | End: 2025-07-15 | Stop reason: HOSPADM

## 2025-07-15 RX ORDER — SODIUM CHLORIDE 0.9 % (FLUSH) 0.9 %
5-40 SYRINGE (ML) INJECTION PRN
Status: DISCONTINUED | OUTPATIENT
Start: 2025-07-15 | End: 2025-07-15 | Stop reason: HOSPADM

## 2025-07-15 RX ORDER — SIMETHICONE 40MG/0.6ML
40 SUSPENSION, DROPS(FINAL DOSAGE FORM)(ML) ORAL AS NEEDED
Status: DISCONTINUED | OUTPATIENT
Start: 2025-07-15 | End: 2025-07-15 | Stop reason: HOSPADM

## 2025-07-15 RX ORDER — PROPOFOL 10 MG/ML
INJECTION, EMULSION INTRAVENOUS
Status: DISCONTINUED | OUTPATIENT
Start: 2025-07-15 | End: 2025-07-15 | Stop reason: SDUPTHER

## 2025-07-15 RX ADMIN — PROPOFOL 300 MCG/KG/MIN: 10 INJECTION, EMULSION INTRAVENOUS at 07:34

## 2025-07-15 RX ADMIN — PROPOFOL 100 MG: 10 INJECTION, EMULSION INTRAVENOUS at 07:33

## 2025-07-15 RX ADMIN — LIDOCAINE HYDROCHLORIDE 100 MG: 20 INJECTION, SOLUTION INTRAVENOUS at 07:32

## 2025-07-15 ASSESSMENT — PAIN - FUNCTIONAL ASSESSMENT: PAIN_FUNCTIONAL_ASSESSMENT: 0-10

## 2025-07-15 ASSESSMENT — PAIN SCALES - GENERAL: PAINLEVEL_OUTOF10: 0

## 2025-07-15 ASSESSMENT — LIFESTYLE VARIABLES: SMOKING_STATUS: 1

## 2025-07-15 NOTE — DISCHARGE INSTRUCTIONS
[facility]  [facility]  Inocente Byers MD  [phone]      July 15, 2025    Stephan Chinchilla  YOB: 1945    COLONOSCOPY DISCHARGE INSTRUCTIONS    If there is redness at IV site you should apply warm compress to area.  If redness or soreness persist contact Dr. Byers' or your primary care doctor.    There may be a slight amount of blood passed from the rectum.  Gaseous discomfort may develop, but walking, belching will help relieve this.  You may not operate a vehicle for 12 hours  You may not operate machinery or dangerous appliances for rest of today  You may not drink alcoholic beverages for 12 hours  Avoid making any critical decisions for 24 hours    DIET:  You may resume your normal diet, but some patients find that heavy or large meals may lead to indigestion or vomiting.  I suggest a light meal as first food intake.    MEDICATIONS:  The use of some over-the-counter pain medication may lead to bleeding after colon biopsies or polyp removal.  Tylenol (also called acetaminophen) is safe to take even if you have had colonoscopy with polyp removal.  Based on the procedure you had today you may not safely take aspirin or aspirin-like products for the next ten (10) days.  Remember that Tylenol (also called acetaminophen) is safe to take after colonoscopy even if you have had biopsies or polyps removed.    ACTIVITY:  You may resume your normal household activities, but it is recommended that you spend the remainder of the day resting -  avoid any strenuous activity.    CALL DR. BYERS' OFFICE IF:  Increasing pain, nausea, vomiting  Abdominal distension (swelling)  Significant new or increased bleeding (oral or rectal)  Fever/Chills  Chest pain/shortness of breath                       Additional instructions:   No aspirin 5 days, resume plavix Thursday.  The upper GI exam revealed no cancer or ulcers but we found an AVM

## 2025-07-15 NOTE — ANESTHESIA POSTPROCEDURE EVALUATION
Department of Anesthesiology  Postprocedure Note    Patient: Stephan Chinchilla  MRN: [id number]  YOB: 1945  Date of evaluation: 7/15/2025    Procedure Summary       Date: 07/15/25 Room / Location: [facility] ENDO 03 / [facility] ENDOSCOPY    Anesthesia Start: 0729 Anesthesia Stop: 0745    Procedures:       COLONOSCOPY (Lower GI Region)      ESOPHAGOGASTRODUODENOSCOPY (Upper GI Region)      ESOPHAGOGASTRODUODENOSCOPY BIOPSY (Upper GI Region)      ESOPHAGOGASTRODUODENOSCOPY CONTROL HEMORRHAGE (Upper GI Region) Diagnosis:       Iron deficiency anemia, unspecified iron deficiency anemia type      Hematochezia      Long term current use of antithrombotics/antiplatelets      (Iron deficiency anemia, unspecified iron deficiency anemia type [D50.9])      (Hematochezia [K92.1])      (Long term current use of antithrombotics/antiplatelets [Z79.02])    Surgeons: Inoecnte Cadet MD Responsible Provider: Carla Hardy MD    Anesthesia Type: MAC ASA Status: 3            Anesthesia Type: MAC    Dominic Phase I: Dominic Score: 10    Dominic Phase II: Dominic Score: 10    Anesthesia Post Evaluation    Patient location during evaluation: PACU  Patient participation: complete - patient participated  Level of consciousness: awake  Airway patency: patent  Nausea & Vomiting: no vomiting and no nausea  Cardiovascular status: hemodynamically stable  Respiratory status: acceptable  Hydration status: stable  Pain management: adequate    No notable events documented.

## 2025-07-15 NOTE — H&P
Telemedicine Visit     Patient Name: Stephan Chinchilla   Account #: [id number]    Gender: Male    (age): 1945 (79)      Provider: Maria Elnea Montoya MD      Date: 2025 3:00 PM   Audio and/or Visual: Audio only Technology used for visit      Patient Location For Telemedicine Visit: Patient reports planning to do visit at their home      Referring Physician: Inocente Mccullough  [address] [place] [address] [address] [address]  [phone] (phone)  [phone] (fax)      Chief Complaint: Anemia           History of Present Illness:   This is 79-year-old male with hypertension, hyperlipidemia, sleep apnea, coronary artery disease, status post stent- on bASA+ plavix, prostate cancer status post prostatectomy, left carotid endarterectomy is here for anemia.    Blood in the stool x 4-5 months, small amount at a time, intermittent, normal solid consistency otherwise, no black stools.     \"6 inch of colon removed for large polyp 15+ years ago\".     Blood work 2025. CBC. Hemoglobin 10 g/dL.  Ferritin 15. CMP normal.  B12 251.  Iron saturation 10%.    Colonoscopy 2012. Supposed to repeat in 5 years.  FOBT negative in 2018.      Past Medical History      Medical Conditions: High blood pressure  High cholesterol  Ischemic Heart Disease  S/P Stents x3  Thyroid problems   Surgical Procedures: Other major surgeries:  Appendectomy  Colon Resection   Dx Studies: Abdominal U/S  Barium Swallow  CAT Scan  Colonoscopy   Medications: allopurinol 300 mg  atorvastatin 40 mg  clopidogrel 75 mg  dorzolamide-timolol 22.3-6.8 mg/mL INSTILL 1 DROP IN LEFT EYE TWICE DAILY  latanoprost 0.005%  levothyroxine 125 mcg TAKE 1 TABLET BY MOUTH DAILY IN THE MORNING ON AN EMPTY STOMACH  metoprolol tartrate 25 mg  olmesartan 5 mg   Allergies: Penicillins  Sulfa (Sulfonamide Antibiotics)   Immunizations: No Immunizations      Social History      Alcohol: None   Tobacco: Current some day smoker  Cigar.   Drugs: None

## 2025-07-15 NOTE — OP NOTE
[facility]  [facility]  Inocente Cadet MD  [phone]      July 15, 2025    Esophagogastroduodenoscopy & Colonoscopy Procedure Note  Stephan Chinchilla  : 1945  [facility] Medical Record Number: [id number]      Indications:   Anemia BRBPR  Referring Physician:  Inocente Mccullough MD  Anesthesia/Sedation: Conscious Sedation/Moderate Sedation/MAC  Endoscopist:  Dr. Inocente Cadet  Complications:  None  Estimated Blood Loss:  None    Permit:  The indications, risks, benefits and alternatives were reviewed with the patient or their decision maker who was provided an opportunity to ask questions and all questions were answered.  The specific risks of esophagogastroduodenoscopy with conscious sedation were reviewed, including but not limited to anesthetic complication, bleeding, adverse drug reaction, missed lesion, infection, IV site reactions, and intestinal perforation which would lead to the need for surgical repair.  Alternatives to EGD and colonoscopy including radiographic imaging, observation without testing, or laboratory testing were reviewed as well as the limitations of those alternatives discussed.  After considering the options and having all their questions answered, the patient or their decision maker provided both verbal and written consent to proceed.      -----------EGD------------   Procedure in Detail:  After obtaining informed consent, positioning of the patient in the left lateral decubitus position, and conduction of a pre-procedure pause or \"time out\" the endoscope was introduced into the mouth and advanced to the duodenum.  A careful inspection was made, and findings or interventions are described below.    Findings:   Esophagus:normal  Stomach: normal   Duodenum/jejunum: Single non bleeding AVM D3, ablated with APC.  Cold forceps biopsies of duodenum taken for exclusion of celiac

## 2025-07-15 NOTE — PERIOP NOTE
Received recovery report from anesthesia team, see anesthesia note. Abdomen remains soft and non-tender post-procedure. Pt has no complaints at this time and tolerated procedure well. Endoscope was pre-cleaned at the bedside by Cordelia Knight immediately following procedure. Post recovery report given to Francisca Metcalf.

## 2025-07-15 NOTE — H&P
Pre-Endoscopy H&P Update  Chief complaint/HPI/ROS:  The indication for the procedure, the patient's history and the patient's current medications are reviewed prior to the procedure and that data is reported on the H&P to which this document is attached.  Any significant complaints with regard to organ systems will be noted, and if not mentioned then a review of systems is not contributory.  Past Medical History:   Diagnosis Date    CAD (coronary artery disease)     Cancer (HCC)     prostate    Hyperlipidemia     Hypertension     Skin cancer       Past Surgical History:   Procedure Laterality Date    CARDIAC PROCEDURE N/A 6/21/2024    Left heart cath / coronary angiography performed by Simon Lloyd MD at [facility] CARDIAC CATH LAB    CARDIAC PROCEDURE N/A 6/21/2024    Instantaneous wave-free ratio (iFR) performed by Simon Lloyd MD at [facility] CARDIAC CATH LAB    COLONOSCOPY      EYE SURGERY Bilateral     cataracts    ORTHOPEDIC SURGERY      bilateral carpal tunnel    OTHER SURGICAL HISTORY      colon resection due to polyp    NE UNLISTED PROCEDURE ABDOMEN PERITONEUM & OMENTUM      hernia x2    NE UNLISTED PROCEDURE CARDIAC SURGERY      stents x2    PROSTATE SURGERY      prostatectomy    TONSILLECTOMY      VASCULAR SURGERY      right endarterectomy     Social   Social History     Tobacco Use    Smoking status: Some Days     Types: Cigars    Smokeless tobacco: Never   Substance Use Topics    Alcohol use: Not Currently     Comment: seldom      Family History   Problem Relation Age of Onset    Diabetes Mother     Arthritis Mother     High Blood Pressure Mother     High Cholesterol Mother     Heart Disease Father       Allergies   Allergen Reactions    Pcn [Penicillins] Rash    Sulfa Antibiotics Rash      Prior to Admission Medications   Prescriptions Last Dose Informant Patient Reported? Taking?   allopurinol (ZYLOPRIM) 300 MG tablet 7/14/2025 Morning  Yes Yes   Sig: Take 1 tablet by mouth daily   aspirin 81 MG chewable

## 2025-07-15 NOTE — ANESTHESIA PRE PROCEDURE
Department of Anesthesiology  Preprocedure Note       Name:  Stephan Chinchilla   Age:  79 y.o.  :  1945                                          MRN:  [id number]         Date:  7/15/2025      Surgeon: Surgeon(s):  Inocente Cadet MD    Procedure: Procedure(s):  COLONOSCOPY  ESOPHAGOGASTRODUODENOSCOPY    Medications prior to admission:   Prior to Admission medications    Medication Sig Start Date End Date Taking? Authorizing Provider   allopurinol (ZYLOPRIM) 300 MG tablet Take 1 tablet by mouth daily   Yes Marcelo Roper MD   levothyroxine (SYNTHROID) 125 MCG tablet Take 1 tablet by mouth Daily   Yes Marcelo Roper MD   metoprolol succinate (TOPROL XL) 25 MG extended release tablet Take 1 tablet by mouth 2 times daily   Yes Marcelo Roper MD   latanoprost (XALATAN) 0.005 % ophthalmic solution Place 1 drop into both eyes nightly Left eye BID, right eye daily   Yes Marcelo Roper MD   dorzolamide-timolol (COSOPT) 2-0.5 % ophthalmic solution Place 1 drop into the left eye daily   Yes Marcelo Roper MD   olmesartan (BENICAR) 5 MG tablet Take 1 tablet by mouth daily   Yes Marcelo Roper MD   aspirin 81 MG chewable tablet Take 1 tablet by mouth daily    Marcelo Roper MD   atorvastatin (LIPITOR) 40 MG tablet Take 1 tablet by mouth daily    Marcelo Roper MD   clopidogrel (PLAVIX) 75 MG tablet Take 1 tablet by mouth daily    Marcelo Roper MD       Current medications:    No current facility-administered medications for this encounter.       Allergies:    Allergies   Allergen Reactions   • Pcn [Penicillins] Rash   • Sulfa Antibiotics Rash       Problem List:    Patient Active Problem List   Diagnosis Code   • Chest pain R07.9       Past Medical History:        Diagnosis Date   • CAD (coronary artery disease)    • Cancer (HCC)     prostate   • Hyperlipidemia    • Hypertension    • Skin cancer        Past Surgical History:        Procedure Laterality

## (undated) DEVICE — TOOL INSRT ANGI GDWIRE MTL SS --

## (undated) DEVICE — DEVICE INFL SYR BLLN ENDO 30 -- INTELLI

## (undated) DEVICE — GUIDEWIRE VASC L260CM DIA0.035IN TIP L3MM STD EXCHG PTFE J

## (undated) DEVICE — SYRINGE MED 10ML RED POLYCARB BRL FIX M LUER CONN FLAT GRP

## (undated) DEVICE — KIT COLON W/ 1.1OZ LUB AND 2 END

## (undated) DEVICE — BLUNTFILL WITH FILTER: Brand: MONOJECT

## (undated) DEVICE — SOLIDIFIER FLD 2OZ 1500CC N DISINF IN BTL DISP SAFESORB

## (undated) DEVICE — BAG DRN WSTE 48X72IN 1400ML -- MERIT DISPOSABLE DEPOT

## (undated) DEVICE — SET ADMIN 16ML TBNG L100IN 2 Y INJ SITE IV PIGGY BK DISP (ORDER IN MULIPLES OF 48)

## (undated) DEVICE — 3M™ STERI-DRAPE™ SMALL DRAPE WITH ADHESIVE APERTURE 1092 25/BX,4/CS&#X20;: Brand: STERI-DRAPE™

## (undated) DEVICE — GOWN,PREVENTION PLUS,XLN/2XL,ST,22/CS: Brand: MEDLINE

## (undated) DEVICE — PRESSURE TUBING: Brand: TRUWAVE

## (undated) DEVICE — 3 ML SYRINGE WITH HYPODERMIC SAFETY NEEDLE: Brand: MONOJECT

## (undated) DEVICE — BITEBLOCK ENDOSCP 60FR MAXI WHT POLYETH STURDY W/ VELC WVN

## (undated) DEVICE — (D)SYR LR LCK 1ML GRAD NSAF 20 -- DISC USE ITEM 364894

## (undated) DEVICE — RUNTHROUGH NS EXTRA FLOPPY PTCA GUIDEWIRE: Brand: RUNTHROUGH

## (undated) DEVICE — ELECTRODE,RADIOTRANSLUCENT,FOAM,3PK: Brand: MEDLINE

## (undated) DEVICE — SYRINGE MED 5ML STD CLR PLAS LUERLOCK TIP N CTRL DISP

## (undated) DEVICE — SYRINGE MEDICAL 3ML CLEAR PLASTIC STANDARD NON CONTROL LUERLOCK TIP DISPOSABLE

## (undated) DEVICE — VALVE HEMSTAS 9FR POLYCARB L BOR DBL Y ACCS +

## (undated) DEVICE — CANNULA CUSH AD W/ 14FT TBG

## (undated) DEVICE — CATHETER IV 22GA L1IN OD0.8382-0.9144MM ID0.6096-0.6858MM 382523

## (undated) DEVICE — Device

## (undated) DEVICE — BLUNTFILL: Brand: MONOJECT

## (undated) DEVICE — SYRINGE ANGIO 20ML WHT POLYCARB VAC PRSS CAP PLUNG FIX M

## (undated) DEVICE — 1200 GUARD II KIT W/5MM TUBE W/O VAC TUBE: Brand: GUARDIAN

## (undated) DEVICE — RADIFOCUS GLIDEWIRE: Brand: GLIDEWIRE

## (undated) DEVICE — ADAPTER IV TBNG CLR POLYCARB DBL M LUERLOCK

## (undated) DEVICE — SYR MED LUER LCK 10ML PUR -- MEDALLION

## (undated) DEVICE — ELECTRODE PT RET AD L9FT HI MOIST COND ADH HYDRGEL CORDED

## (undated) DEVICE — GLIDESHEATH SLENDER ACCESS KIT: Brand: GLIDESHEATH SLENDER

## (undated) DEVICE — SC 3W MP RA OFF PB - PG: Brand: NAMIC

## (undated) DEVICE — BAND RADIAL COMPR ARTERY 24CM -- REG BX/10

## (undated) DEVICE — SURGICAL PROCEDURE TRAY CRD CATH 3 PRT

## (undated) DEVICE — CATHETER GUID 6FR L100CM DIA0.071IN NYL SHFT EBU3.5

## (undated) DEVICE — BOWL MED M 16OZ PLAS CAP GRAD

## (undated) DEVICE — BENTSON WIRE GUIDE 20CM DISTAL FLEXIBILITY WITH SOFTENED TIP: Brand: BENTSON

## (undated) DEVICE — IV STRT KT 3282] LSL INDUSTRIES INC]